# Patient Record
Sex: MALE | URBAN - METROPOLITAN AREA
[De-identification: names, ages, dates, MRNs, and addresses within clinical notes are randomized per-mention and may not be internally consistent; named-entity substitution may affect disease eponyms.]

---

## 2017-01-31 ENCOUNTER — TELEPHONE (OUTPATIENT)
Dept: CARDIOLOGY | Facility: CLINIC | Age: 22
End: 2017-01-31

## 2017-01-31 NOTE — TELEPHONE ENCOUNTER
"Chart prep for Dr Rodriguez visit:  Patient states no MD appointments other than for dermatology - no cardiac testing.  PMD Theresa Westbrook - requested records - patient states minimal visits.  Patient states family history of cardiac problems, \"was told had murmur as a child\"; over the last several weeks complaint of sharp and dull chest ache/tightness lasting 2-3 minutes, has noted improvement this week.   Sarah Teixeira RN     "